# Patient Record
(demographics unavailable — no encounter records)

---

## 2024-11-04 NOTE — REASON FOR VISIT
[Initial Evaluation] : an initial evaluation [Family Member] : family member [FreeTextEntry1] : primary care, chronic pain, functional decline, forgetfulness  [FreeTextEntry3] : teo Leon  [FreeTextEntry2] : who assists with history due to patient with baseline cognitive impairment

## 2024-11-04 NOTE — PHYSICAL EXAM
[Normal Outer Ear/Nose] : the ears and nose were normal in appearance [Normal] : normal bowel sounds, non-tender [No CVA Tenderness] : no CVA  tenderness [No Spinal Tenderness] : no spinal tenderness [No Clubbing, Cyanosis] : no clubbing or cyanosis of the fingernails [Normal Hearing] : hearing was not normal [Normal Gait] : abnormal gait [Normal Insight/Judgment] : insight and judgment were not intact [de-identified] : Kyphoscoliosis  [de-identified] : asymmetric gait, slow, cautious, b/l foot deformity  [de-identified] : Lt plantar foot swelling w/ callus  [de-identified] : unsteady gait, SMT loss  [de-identified] : cooperative

## 2024-11-04 NOTE — SOCIAL HISTORY
[Alone] : lives alone [Female] : Female [FreeTextEntry1] : Carolyn [FreeTextEntry4] : daughter [FreeTextEntry3] : HHA Sati, checks in regularly approx 3-4x/day but patient resists help

## 2024-11-04 NOTE — HISTORY OF PRESENT ILLNESS
[No falls in past year] : Patient reported no falls in the past year [Patient is independent with] : bathing [NO] : No [0] : 1) Little interest or pleasure doing things: Not at all (0) [3] : 2) Feeling down, depressed, or hopeless for nearly every day (3) [PHQ-2 Positive] : PHQ-2 Positive [I have developed a follow-up plan documented below in the note.] : I have developed a follow-up plan documented below in the note. [Yes] : Yes [No] : No [Independent] : transferring/mobility [] : Assistance needed with traveling/transport [Smoke Detector] : smoke detector [Carbon Monoxide Detector] : carbon monoxide detector [Grab Bars] : grab bars [Shower Chair] : shower chair [Wears Seat Belt] : wears seat belt [TextBox_31] : Wears HAs, wears HAs from young age  [TextBox_37] : Ophtho [Driving Concerns] : not driving or driving without noted concerns [facility]] : >12  [FreeTextEntry1] : Doesn't like to have someone assist or supervise shower.   [ARQ6Orymd] : 3 [GDS] : 6 on 11/4/24  [AdvancecareDate] : 11/4/24 [FreeTextEntry4] : HCP form reviewed: primary is dtr Carolyn Newton, then dtr Peg (in [place]).  Most important: maintaining cognitive function.  "I don't want my children to see me being unable to recognize them."  GOC: maintaining cognitive function is priority.

## 2024-11-04 NOTE — ASSESSMENT
[FreeTextEntry1] : Available medical records reviewed  Spent >5 mins on Depression screening.  Suspect likely depression, in setting of chronic pain, cognitive decline Previous trial of Duloxetine - stopped d/t side effects?  Will consider trial another antidepressant at next visit - Gabapentin vs Zoloft.  f/u for further discussion at next visit  Pain control discussed - Adv Tylenol 1g TID for pain - Limit Aleeve to PRN mod-severe pain only for now - Previously had SE to Duloxetine?  - Trial another antidepressant? Gabapentin? Lyrica?   PT referral given - also to george for appropriate/safest WAD Fall precautions c/w regular podiatry f/u  Proper footwear discussed - for safety   Would benefit from regular HHA - discussed  Ophtho referral given   - Plan for formal cognitive/mood/behavior assessment at next visit  To request prior Brain imaging Checks labs at next visit f/u within 1 mo

## 2024-11-04 NOTE — REVIEW OF SYSTEMS
[Loss Of Hearing] : hearing loss [As Noted in HPI] : as noted in HPI [Negative] : Heme/Lymph [Suicidal] : not suicidal [FreeTextEntry3] : wears glasses  [FreeTextEntry4] : +HAs

## 2024-11-04 NOTE — HISTORY OF PRESENT ILLNESS
[No falls in past year] : Patient reported no falls in the past year [Patient is independent with] : bathing [NO] : No [0] : 1) Little interest or pleasure doing things: Not at all (0) [3] : 2) Feeling down, depressed, or hopeless for nearly every day (3) [PHQ-2 Positive] : PHQ-2 Positive [I have developed a follow-up plan documented below in the note.] : I have developed a follow-up plan documented below in the note. [Yes] : Yes [No] : No [Independent] : transferring/mobility [] : Assistance needed with traveling/transport [Smoke Detector] : smoke detector [Carbon Monoxide Detector] : carbon monoxide detector [Grab Bars] : grab bars [Shower Chair] : shower chair [Wears Seat Belt] : wears seat belt [TextBox_31] : Wears HAs, wears HAs from young age  [TextBox_37] : Ophtho [Driving Concerns] : not driving or driving without noted concerns [[facility]] : >12  [FreeTextEntry1] : Doesn't like to have someone assist or supervise shower.   [WTI1Egexg] : 3 [GDS] : 6 on 11/4/24  [AdvancecareDate] : 11/4/24 [FreeTextEntry4] : HCP form reviewed: primary is dtr Carolyn Newton, then dtr Peg (in [place]).  Most important: maintaining cognitive function.  "I don't want my children to see me being unable to recognize them."  GOC: maintaining cognitive function is priority.

## 2024-11-04 NOTE — PHYSICAL EXAM
[Normal Outer Ear/Nose] : the ears and nose were normal in appearance [Normal] : normal bowel sounds, non-tender [No CVA Tenderness] : no CVA  tenderness [No Spinal Tenderness] : no spinal tenderness [No Clubbing, Cyanosis] : no clubbing or cyanosis of the fingernails [Normal Hearing] : hearing was not normal [Normal Gait] : abnormal gait [Normal Insight/Judgment] : insight and judgment were not intact [de-identified] : Kyphoscoliosis  [de-identified] : asymmetric gait, slow, cautious, b/l foot deformity  [de-identified] : Lt plantar foot swelling w/ callus  [de-identified] : unsteady gait, SMT loss  [de-identified] : cooperative

## 2024-11-19 NOTE — END OF VISIT
[Time Spent: ___ minutes] : I have spent [unfilled] minutes of time on the encounter which excludes teaching and separately reported services.
X Size Of Lesion In Cm (Optional): 0
Detail Level: Simple

## 2024-11-19 NOTE — REASON FOR VISIT
[Acute] : an acute visit [FreeTextEntry1] : neck pain, headache, forgetfullness [FreeTextEntry3] : teo Leon [FreeTextEntry2] : who assists with history due to patient with baseline cognitive impairment

## 2024-11-19 NOTE — ASSESSMENT
[FreeTextEntry1] : After discussion we decide to pursue imaging of c-spine, and brain for further evaluation.  Pain control w/ Tylenol 1gram TID. Limit use of NSAIDs.  Hold off on PT for now, pending imaging.   f/u in 1 week.   If any new/worsening symptoms advised to call us asap or go to emergency room.
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
## 2024-11-29 NOTE — HISTORY OF PRESENT ILLNESS
[FreeTextEntry1] : Patient reports neck pain usually on the left side but goes back and forth between right and left, radiating to left shoulder sometimes, associated with headache.    Has not tried lidocaine patch yet.  Daughter states picked up OTC Aspercreme patch and will try that first.  If needs stronger will try prescription lidocaine patch.  Sent to [facility].  States has been taking Advil at bedtime which helps.  Discussed r/b/a of trial Gabapentin - family wishes to try. Will send to pharmacy.  Fall precautions discussed. Adv supervision of med intake for safety.  Monitor for side effects.  Adv inc supervision, michelle during medication changes.   New HHA starting this coming Monday - 8h/d on 5d/wk (10a-6p). Family will stop by on weekends.

## 2024-11-29 NOTE — ASSESSMENT
[FreeTextEntry1] : MRI Cervical spine results reviewed.   Referral to neurology given for neck pain/headache, and functional decline PT referral previously given - pt/dtr will share MRI report with therapist  Pain control discussed  c/w Tylenol 1g TID for pain Plan for aspercreme/lido patch trial Will consider trial Gabapentin in future if needed but hold off for now pending inc supervision/assistance at home.  h/o possible side effect to Duloxetine? Unclear as patient not reliable historian.  Pt wishes to c/w Advil. Adv to take Pepcid while on Advil for GI ppx.   Fall precautions.   All questions/concerns addressed.   f/u in 2-4wks for re-evaluation.   If any new/worsening symptoms advised to call us asap or go to emergency room.

## 2024-11-29 NOTE — REASON FOR VISIT
[Follow-Up] : a follow-up visit [FreeTextEntry1] : Neck pain, headache  [FreeTextEntry3] : Dtr Carolyn, dtr Peg  [FreeTextEntry2] : who assists with history due to patient with baseline cognitive impairment

## 2024-12-26 NOTE — HISTORY OF PRESENT ILLNESS
[Home] : at home, [unfilled] , at the time of the visit. [Medical Office: ([facility])___] : at the medical office located in  [Family Member] : family member [FreeTextEntry3] : teo Leon [FreeTextEntry1] :  Today patient reports "very little pain."  Overall pain has been better for past 1 week.  Doing PT.   Yesterday had back pain. Advil helps.  Tried Gabapentin x 1 dose - stopped d/t "stomach side effects."  Hot compress makes pain worse.   Mood has been down.  Pt states biggest priority is not being a burden on her kids.

## 2024-12-26 NOTE — ASSESSMENT
[FreeTextEntry1] : Long discussion about pain, mood, and cognitive symptoms today.  Patient states that her priority is not being a burden on her family.  Pain controlled for past 1 week per patient. Adv to limit use of Advil to mod-severe pain.  c/w Pepcid while on Advil.  Lidocaine patch.  c/w PT  Decline neuro referral for further eval of pain and cognitive changes - state would not .  Pt declines neuropsych testing.   Dtr states will continue discussion with patient re: re-trial of Gabapentin for mood/pain.  f/u in 2-4wks, unless earlier PRN

## 2024-12-26 NOTE — REASON FOR VISIT
[Follow-Up] : a follow-up visit [Family Member] : family member [FreeTextEntry1] : pain, depressed mood  [FreeTextEntry3] : teo Leon

## 2024-12-26 NOTE — PHYSICAL EXAM
[Normal] : alert, in no acute distress [Sclera] : the sclera and conjunctiva were normal [Normal Outer Ear/Nose] : the ears and nose were normal in appearance [Normal Appearance] : the appearance of the neck was normal [No Respiratory Distress] : no respiratory distress [Respiration, Rhythm And Depth] : normal respiratory rhythm and effort [de-identified] : dysphoric mood

## 2025-01-14 NOTE — HISTORY OF PRESENT ILLNESS
[Home] : at home, [unfilled] , at the time of the visit. [Medical Office: ([facility])___] : at the medical office located in  [Family Member] : family member [FreeTextEntry1] :  Today patient reports back of neck is hurting.  Headaches. Rt side, sometimes Lt side.   Neck and head pain - different types of pain, heat helps, not tingling, not sharp, medium to heavy pain, exacerbated by movement. Some nights neck/head pain wakes her up.  Head "heaviness"  Lower back pain - exacerbated by movement   Doing PT but hasn't been regular - PT was out for holiday and now sick.   Advil helps - takes q4h.  Taking Gabapentin 100mg daily at 10am - not noting any difference.   Mood has been better. Getting out of apartment regularly - going to meals in living facility "almost every day".   Nocturia at night only, q2hrs.  Not during the day. Avoids fluids in evening.    [FreeTextEntry3] : teo Leon

## 2025-01-14 NOTE — PHYSICAL EXAM
[Normal] : alert, in no acute distress [Sclera] : the sclera and conjunctiva were normal [Normal Outer Ear/Nose] : the ears and nose were normal in appearance [Normal Appearance] : the appearance of the neck was normal [No Respiratory Distress] : no respiratory distress [Respiration, Rhythm And Depth] : normal respiratory rhythm and effort [Normal Insight/Judgment] : insight and judgment were not intact [de-identified] : dysphoric mood

## 2025-01-14 NOTE — REASON FOR VISIT
[Follow-Up] : a follow-up visit [Family Member] : family member [FreeTextEntry1] : chronic pain, depressed mood  [FreeTextEntry3] : teo Leon [FreeTextEntry2] : who assists with history due to patient with baseline cognitive impairment

## 2025-01-14 NOTE — ASSESSMENT
[FreeTextEntry1] : Discussed pain, mood, and cognitive symptoms.  Patient states that her priority is not being a burden on her family.   Takes Ibuprofen q4h approx.  Takes Gabapentin 100mg @ 10am - dtr states mood is better but no noted improvement in pain. No side effects.   Discussed trial inc Gabapentin to 200mg daily, if tolerating can further go up to 300mg daily after 1 week, then 400mg daily after another week, max 600mg/day. Monitor for side effects - discussed. For mood/pain.  Adv to limit use of Advil to mod-severe pain.  c/w Pepcid while on Advil.  Lidocaine patch - didn't help.   Pain management referral given.  Consider neuro/headache eval.   Discussed nonpharmacologic management of nocturia, including timed voiding, avoiding fluids before bed, avoiding seltzer water/sodas, avoid caffeine, sleep hygiene.  Urogyn ref given. Pelvic floor therapy?   Declines bedside commode.  Fall precautions discussed.  c/w PT  Pt declines neuropsych testing.   f/u in 1 mo, unless earlier PRN

## 2025-02-04 NOTE — PHYSICAL EXAM
[Normal Insight/Judgment] : insight and judgment were not intact [de-identified] : dysphoric mood

## 2025-02-04 NOTE — REASON FOR VISIT
[FreeTextEntry1] : chronic pain, depressed mood  [FreeTextEntry3] : teo Leon [FreeTextEntry2] : who assists with history due to patient with baseline cognitive impairment

## 2025-02-04 NOTE — HISTORY OF PRESENT ILLNESS
[FreeTextEntry1] : Today patient reports left sided neck pain radiating to Lt shoulder.   Seen by ortho and planned for c-spine injection later today w/ pain management Dr. Donald.  Was advised to switch to TYlenol Arthritis but unclear if taking regularly.  Heat helps but was told to switch to cold compress instead.  Advil helps - takes q4h.  Lidocaine patch - didn't help.   Has not been leaving apartment much. States doesn't feel well enough sometimes to go for walk with HHA because feels tired and needs to take a nap in afternoon.     Taking Gabapentin 100mg BID but unclear if making a difference.   Nocturia at night only, q2hrs.  Not during the day. Avoids fluids in evening.  Sleeps from 11p - 9a.   Neck and head pain - different types of pain, heat helps, not tingling, not sharp, medium to heavy pain, exacerbated by movement. Some nights neck/head pain wakes her up.  Head "heaviness"  Lower back pain - exacerbated by movement   Doing PT but being discharged soon. Planned to start OT next.   Activating LTC insurance - requests supporting documentation.       [FreeTextEntry3] : teo Leon

## 2025-02-04 NOTE — ASSESSMENT
[FreeTextEntry1] : Takes Ibuprofen q4h approx.  Takes Gabapentin 100mg BID - c/w same, refilled. Hold off on further titration for now, pending c-spine inj today w/ pain management. Monitor for side effects - discussed. For mood/pain.  c/w cold compresses on/off.  Adv to limit use of Advil to mod-severe pain.  c/w Pepcid while on Advil.  Tylenol up to 3g/day.  Lidocaine patch - didn't help.  Consider neuro/headache eval.   Discussed nonpharmacologic management of nocturia, including timed voiding, avoiding fluids before bed, avoiding seltzer water/sodas, avoid caffeine, sleep hygiene.  Urogyn ref previously given.Pelvic floor therapy?  Will consider trial Myrbetriq but hold off for now as likely risks>benefit.  Declines bedside commode.   Fall precautions.  Completing PT. Pending start OT.   Adv to encourage daytime activity/socialization.   Pt declines neuropsych testing.  GOC: Patient states that her priority is not being a burden on her family.   Dtr will let me know what is needed for LTC insurance application.   f/u in 1 mo, unless earlier PRN

## 2025-03-15 NOTE — ASSESSMENT
[FreeTextEntry1] : Takes Ibuprofen approx q4h - would limit use to PRN mod-severe pain d/t high risk for SEs.  c/w Pepcid while on Advil.  c/w Tylenol up to 3g/day.  c/w cold compresses on/off.  c/w Lidocaine patch  Discussed trial increase Gabapentin 100mg TID to 200mg in AM /200mg in afternoon /100mg QHS for mood/pain. . Can inc QHS dose to 200mg PRN mod-severe pain/insomnia d/t pain.   Appt w/ neuro/headache Dr. Almanzar 4/2025.    Discussed nonpharmacologic management of nocturia, including timed voiding, avoiding fluids before bed, avoiding seltzer water/sodas, avoid caffeine, sleep hygiene.  Would consider trial Myrbetriq but hold off for now as likely risks>benefit.  Urogyn ref again given. Pelvic floor therapy? Purewick?  Declines bedside commode.   Fall precautions.   Adv to encourage daytime activity/socialization.   Pt declines neuropsych testing.  GOC: Patient states that her priority is not being a burden on her family.   f/u in 1 mo, unless earlier PRN

## 2025-03-15 NOTE — HISTORY OF PRESENT ILLNESS
[Home] : at home, [unfilled] , at the time of the visit. [Medical Office: ([facility])___] : at the medical office located in  [Family Member] : family member [FreeTextEntry1] : Today patient reports being more active since last visit - going down for meals regularly at the [facility] which wasn't doing before.   Mood is better.   Left sided neck/head pain radiating to Lt shoulder with "some relief."  Had nerve block w/ pain mgmt Dr. Donald since last visit.  Neck and head pain - different types of pain, no tingling, not sharp, exacerbated by movement, a/w head "heaviness."  Lower back pain - exacerbated by movement.  Takes Tylenol 8hr Arthritis approx. BID-TID.  Cold compress helps.  Advil helps - takes q4h.  Lidocaine patch.   Taking Gabapentin 100mg TID and tolerating well.   Main concern is urinary frequency at night, q1-2hrs. Distrupts sleep.   Nocturia at night only, q2hrs.  Not during the day. Avoids fluids in evening.  Sleeps from 11p - 9a.  Has been limiting fluids d/t urinary freq.    [FreeTextEntry3] : teo Leon

## 2025-03-15 NOTE — PHYSICAL EXAM
[Sclera] : the sclera and conjunctiva were normal [Normal] : alert, in no acute distress [Normal Outer Ear/Nose] : the ears and nose were normal in appearance [Normal Appearance] : the appearance of the neck was normal [No Respiratory Distress] : no respiratory distress [No Acc Muscle Use] : no accessory muscle use [Respiration, Rhythm And Depth] : normal respiratory rhythm and effort [Normal Gait] : abnormal gait [Normal Insight/Judgment] : insight and judgment were not intact [de-identified] : dysphoric mood

## 2025-04-16 NOTE — REVIEW OF SYSTEMS
[Incontinence] : incontinence [Wake up at night to urinate  How many times?  ___] : wakes up to urinate [unfilled] times during the night [Leakage of urine with urgency] : leakage of urine with urgency [Leakage of urine with straining, coughing, laughing] : leakage of urine with straining, coughing, laughing

## 2025-04-22 NOTE — ADDENDUM
[FreeTextEntry1] : This note was partly authored by Rj Yi working as a scribe for JOSE Dumont. I, JOSE Dumont, have reviewed the content of this note and confirm it is true and accurate. I personally performed the history and physical examination and made all the decisions. 04/16/2025.

## 2025-04-22 NOTE — ASSESSMENT
[FreeTextEntry1] : Due to issues with back pain, patient fins it difficult to sit on the toilet seat.  Unable to provide urine specimen today  Residual today: 47 cc  Explained to patient that Nocturia is not structural. Counseled about factors that could cause or worsen nocturia including underlying sleep apnea, fluid in her legs, and sleep disorders. OAB meds don't typically help nocturia to a significant degree and given the side effects I do not recommend trying them for nocturia.  I recommend leg elevation and see if that improves nocturia. She is also urged to wear compression socks.    The total amount of time I have personally spent preparing for this visit, reviewing the patient's test results, obtaining external history, ordering tests/medications, documenting clinical information, communicating with and counseling the patient/family and/or caregiver(s), reviewing old records, and spent face to face with the patient explaining the above was __46 minutes.

## 2025-04-22 NOTE — PHYSICAL EXAM
[Normal Appearance] : normal appearance [General Appearance - In No Acute Distress] : no acute distress [] : no respiratory distress [Skin Color & Pigmentation] : normal skin color and pigmentation [de-identified] : Residual today: 47 cc

## 2025-04-22 NOTE — PHYSICAL EXAM
[Normal Appearance] : normal appearance [General Appearance - In No Acute Distress] : no acute distress [] : no respiratory distress [Skin Color & Pigmentation] : normal skin color and pigmentation [de-identified] : Residual today: 47 cc

## 2025-04-22 NOTE — HISTORY OF PRESENT ILLNESS
[FreeTextEntry1] : 04/16/2025: Ms. VYAS is a 93-year-old female with h/o HTN, Early Dementia presenting today with nocturia  Pt c/o nocturia that she finds bothersome  She also Nighttime UUI and daytime AILEEN for which she uses safety pads.  No daytime urgency frequency or incontinence.   She goes to bed at around 11 am; and wakes up at around 3am to 4 am.  Her feet are usually elevated before bedtime.   She drinks 1 cup of coffee in the morning Does not drink Alcohol.   She voids x4 during the daytime -Residual today 47 cc (not retaining)  -UA on 3/18/25 showed 6 wbc;  6 epithelial cells; and LE. Otherwise, normal -Cx was negative.  -Cr 1.19 w eGFR 43 on 11/20/24 (diminished renal fxn but we only have one value)  No available abdominal imaging.   Bowels are good and regular

## 2025-04-24 NOTE — HISTORY OF PRESENT ILLNESS
[FreeTextEntry1] : Uro Dr. Guzman - visit note appreciated in EMR  PM&R Dr. Donald - seen for f/u since last visit, including for occipital n. block 3 weeks ago, and f/u visit this past Mon 4/21/25. Daughter states pain initially improved but now back to where it was prior to nerve block.  TODAY patient reports some improvement in urinary freq. States this limits her ability to leave her apartment, in addition to uncontrolled neck pain.  Neck and head pain - different types of pain, no tingling, not sharp, exacerbated by movement, a/w head "heaviness." Left sided neck/head pain radiating to Lt shoulder.  Lower back pain - exacerbated by movement. Takes Tylenol 8hr Arthritis approx. BID-TID.  Cold compress helps.  Advil helps - takes q4h.  Lidocaine patch.   Taking Gabapentin 200mg TID and tolerating well.   Cancelled appt w/ neuro/headache Dr. Almanzar in 4/2025.    Adds that her memory is declining and she would like to try Aricept again to help slow down progression.   Mood is "same."  Slept for 6hrs continuously last night. States once she wakes up difficult to go back to sleep. UF distrupts sleep.   Nocturia at night only, not during the day. Avoids fluids in evening.  Sleeps from 11p - 9a.    [Home] : at home, [unfilled] , at the time of the visit. [Medical Office: ([facility])___] : at the medical office located in  [Family Member] : family member [FreeTextEntry3] : teo Leon

## 2025-04-24 NOTE — REASON FOR VISIT
[Follow-Up] : a follow-up visit [Family Member] : family member [FreeTextEntry1] : urinary freq, chronic pain, depressed mood , anxiety, memory loss [FreeTextEntry3] : teo Leon [FreeTextEntry2] : who assists with history due to patient with baseline cognitive impairment

## 2025-04-24 NOTE — PHYSICAL EXAM
[Sclera] : the sclera and conjunctiva were normal [Normal Outer Ear/Nose] : the ears and nose were normal in appearance [Normal Appearance] : the appearance of the neck was normal [No Respiratory Distress] : no respiratory distress [No Acc Muscle Use] : no accessory muscle use [Respiration, Rhythm And Depth] : normal respiratory rhythm and effort [Normal Gait] : abnormal gait [Normal] : normal skin color and pigmentation [Normal Insight/Judgment] : insight and judgment were not intact [de-identified] : limited exam  [de-identified] : dysphoric mood

## 2025-04-24 NOTE — ASSESSMENT
[FreeTextEntry1] :  Takes Ibuprofen approx q4h - would limit use to PRN mod-severe pain d/t high risk for SEs.  c/w Pepcid while on Advil.  c/w Tylenol up to 3g/day.  c/w cold compresses on/off.  c/w Lidocaine patch c/w Gabapentin 200mg in AM / INCREASE Gabapentin to 300mg in afternoon / c/w 200mg QHS for mood/pain/insomnia. In future will consider trial switch Gabapentin to Lyrica if needed.  MM?   Discussed nonpharmacologic management of nocturia, including timed voiding, avoiding fluids before bed, avoiding seltzer water/sodas, avoid caffeine, sleep hygiene.  Can consider trial Myrbetriq but hold off for now as likely risks>benefit.  Declines bedside commode.  Urogyjose vazquez appreciated in EMR.    Fall precautions.  Adv to encourage daytime activity/socialization.   Declines neuropsych testing.  GOC: Patient states that her priority is not being a burden on her family.  Discussed r/b/a of Donepezil - wishes to re-trial. Sent to pharmacy. Monitor for SEs.   f/u in 1 mo, unless earlier PRN

## 2025-05-19 NOTE — HISTORY OF PRESENT ILLNESS
[Home] : at home, [unfilled] , at the time of the visit. [Medical Office: ([facility])___] : at the medical office located in  [Family Member] : family member [FreeTextEntry1] : Uro Dr. Guzman - visit note appreciated in EMR  PM&R Dr. Donald - seen for f/u since last visit - planned for trial epidural at next f/u visit next Tuesday.  TODAY patient reports 7/8 intensity posterior neck pain on/off that changes in character and intensity, Lt occipital head pain, and tolerable chronic LBP which has been there since teenager and unchanged.  Neck/head pain is difficult for pt to describe, not achy/sharp/or throbbing. Worse with change in position, head rotation to either side, better when looking forwards/straight. Limits her ability to go out for meals because afraid will need to turn her head to speak to others at [facility] table and pain will be worse.   Dtr states Dr. Woodard appreciated some decrease in neck on exam at last visit, although patient did not notice a difference after nerve block given.   Takes Tylenol 8hr Arthritis approx. BID-TID.  Cold compress helps.  Advil helps - takes BID.  Lidocaine patch does not help - stopped.   On Gabapentin 200mg in AM / 300mg in afternoon / 200mg QHS for mood/pain/insomnia.  Mood is "same."  TOILETING: Sleeps from 11p - 9a. Nocturia at night only, not during the day. UF distrupts sleep.   Avoids fluids in evening.    [FreeTextEntry3] : teo Leon

## 2025-05-19 NOTE — REASON FOR VISIT
[Follow-Up] : a follow-up visit [Family Member] : family member [FreeTextEntry1] : chronic pain, depressed mood, anxiety, memory loss [FreeTextEntry3] : teo Leon [FreeTextEntry2] : who assists with history due to patient with baseline cognitive impairment

## 2025-05-19 NOTE — PHYSICAL EXAM
[Sclera] : the sclera and conjunctiva were normal [Normal Outer Ear/Nose] : the ears and nose were normal in appearance [Normal Appearance] : the appearance of the neck was normal [No Respiratory Distress] : no respiratory distress [No Acc Muscle Use] : no accessory muscle use [Respiration, Rhythm And Depth] : normal respiratory rhythm and effort [Normal] : normal skin color and pigmentation [Involuntary Movements] : no involuntary movements were seen [Normal Insight/Judgment] : insight and judgment were not intact [de-identified] : limited exam  [de-identified] : dysphoric mood, repetitive

## 2025-05-19 NOTE — ASSESSMENT
[FreeTextEntry1] : Takes Ibuprofen approx BID - would limit use to PRN mod-severe pain d/t high risk for SEs.  c/w Pepcid while on Advil.  c/w Tylenol up to 3g/day.  c/w cold compresses on/off.  Stopped Lidocaine patch - "doesn't help"  c/w Gabapentin 200mg in AM / 300mg in afternoon / 200mg QHS for mood/pain/insomnia. Discussed possible trial switch Gabapentin to Lyrica but we decide to hold off pending scheduled epidural next Tues. If ineffective will trial switch Gabapentin to Lyrica.  MM?   c/w nonpharmacologic management of nocturia, including timed voiding, avoiding fluids before bed, avoiding seltzer water/sodas, avoid caffeine, sleep hygiene.  Can consider trial Myrbetriq but hold off for now as likely risks>benefit.  Declines bedside commode.   Fall precautions.  Adv to encourage daytime activity/socialization.   Declines neuropsych testing.  GOC: Patient states that her priority is not being a burden on her family.  c/w Donepezil. Monitor for SEs.   f/u in 2-4 wks, unless earlier PRN

## 2025-07-06 NOTE — HISTORY OF PRESENT ILLNESS
[FreeTextEntry1] : FILI VYAS is a 94 yo woman with PMH of depression, cognitive impairment and chronic pain who presents for acute visit. Patient is accompanied by her daughter who provided collateral history.   Depression: -patient states "my body is no longer doing what I want it to do and that is very psychologically upsetting" -patient feels that chronic pain is the reason for her depression -daughter feels that depression is exacerbating chronic pain -patient states that on days when she feels ok, she still does not want to go out and do things because she is scared the pain will get worse while she is out  -spends all of her time at home  -patient also has underlying cognitive impairment that is a likely contributor to her symptoms  Chronic pain: -mainly in her neck  -pain is nerve related  -follows with pain management who has done many interventions including an injection on Wednesday that has provided some relief. Rates pain as 3/10 today. Prior to injection, pain was 8/10 -Patient is currently on lyrica 25mg qam, 25mg qpm and gabapentin 200mg qhs

## 2025-07-06 NOTE — PHYSICAL EXAM
[Alert] : alert [No Acute Distress] : in no acute distress [No Respiratory Distress] : no respiratory distress [Normal Gait] : abnormal gait [Normal Affect] : the affect was normal

## 2025-07-06 NOTE — REASON FOR VISIT
[Home] : at home, [unfilled] , at the time of the visit. [Medical Office: ([facility])___] : at the medical office located in  [Telehealth (audio & video)] : This visit was provided via telehealth using real-time 2-way audio visual technology. [Acute] : an acute visit [Verbal consent obtained from patient] : the patient, [unfilled] [Family Member] : family member

## 2025-07-06 NOTE — REASON FOR VISIT
[Home] : at home, [unfilled] , at the time of the visit. [Medical Office: ([facility])___] : at the medical office located in  [Telehealth (audio & video)] : This visit was provided via telehealth using real-time 2-way audio visual technology. [Acute] : an acute visit [Verbal consent obtained from patient] : the patient, [unfilled] [Family Member] : family member

## 2025-07-06 NOTE — REVIEW OF SYSTEMS
[Feeling Poorly] : feeling poorly [Feeling Tired] : feeling tired [Discharge From Eyes] : no purulent discharge from the eyes [Dry Eyes] : no dryness of the eyes [Nasal Discharge] : no nasal discharge [Sore Throat] : no sore throat [Chest Pain] : no chest pain [Shortness Of Breath] : no shortness of breath [Wheezing] : no wheezing [Cough] : no cough [SOB on Exertion] : no shortness of breath during exertion [Abdominal Pain] : no abdominal pain [Vomiting] : no vomiting [Dysuria] : no dysuria [Limb Pain] : limb pain [Skin Wound] : no skin wound [Confused] : confusion [Dizziness] : no dizziness [As Noted in HPI] : as noted in HPI [Suicidal] : not suicidal [Anxiety] : anxiety [Depression] : depression

## 2025-07-15 NOTE — PHYSICAL EXAM
[Sclera] : the sclera and conjunctiva were normal [Normal Outer Ear/Nose] : the ears and nose were normal in appearance [Normal Appearance] : the appearance of the neck was normal [No Respiratory Distress] : no respiratory distress [No Acc Muscle Use] : no accessory muscle use [Respiration, Rhythm And Depth] : normal respiratory rhythm and effort [Involuntary Movements] : no involuntary movements were seen [Normal] : normal skin color and pigmentation [Normal Gait] : abnormal gait [Normal Insight/Judgment] : insight and judgment were not intact [de-identified] : limited ROM neck  [de-identified] : limited exam  [de-identified] : dysphoric mood, repetitive

## 2025-07-15 NOTE — PHYSICAL EXAM
[Sclera] : the sclera and conjunctiva were normal [Normal Outer Ear/Nose] : the ears and nose were normal in appearance [Normal Appearance] : the appearance of the neck was normal [No Respiratory Distress] : no respiratory distress [No Acc Muscle Use] : no accessory muscle use [Respiration, Rhythm And Depth] : normal respiratory rhythm and effort [Involuntary Movements] : no involuntary movements were seen [Normal] : normal skin color and pigmentation [Normal Gait] : abnormal gait [Normal Insight/Judgment] : insight and judgment were not intact [de-identified] : limited ROM neck  [de-identified] : limited exam  [de-identified] : dysphoric mood, repetitive

## 2025-07-15 NOTE — ASSESSMENT
[FreeTextEntry1] : Takes Ibuprofen approx BID - would limit use to PRN mod-severe pain d/t high risk for SEs.  c/w Pepcid while on Advil.  c/w Tylenol up to 3g/day.  c/w cold compresses on/off.  Stopped Lidocaine patch - "doesn't help"  c/w Lyrica 25mg TID MM?   Lab work ordered - f/u results Will consider add trial Cymbalta for mood/pain.  Carbamazepine trial for Trigeminal Neuralgia vs rhizotomy? f/u with PMR  c/w nonpharmacologic management of nocturia, including timed voiding, avoiding fluids before bed, avoiding seltzer water/sodas, avoid caffeine, sleep hygiene.  Can consider trial Myrbetriq but hold off for now as likely risks>benefit.  Declines bedside commode.   Fall precautions.  Adv to encourage daytime activity/socialization.   Declines neuropsych testing.  GOC: Patient states that her priority is not being a burden on her family.  c/w Donepezil. Monitor for SEs.   f/u in 2-4 wks, unless earlier PRN

## 2025-07-15 NOTE — HISTORY OF PRESENT ILLNESS
[Home] : at home, [unfilled] , at the time of the visit. [Medical Office: ([facility])___] : at the medical office located in  [Family Member] : family member [FreeTextEntry1] : Uro Dr. Guzman PM&R Dr. Donald - seen for f/u since last visit - tried epidural, minimal relief. Considering rhizotomy for chronic neck/head pain, also mentioned possible trigeminal neuralgia although was told he does not treat this.   TODAY patient reports persistent neck pain and depressed mood.   7/8 intensity posterior neck pain on/off that changes in character and intensity, a/w Lt lower posterior head pain.  Neck/head pain is difficult for pt to describe, not achy/sharp/or throbbing. Worse with change in position, head rotation to either side exacerbates pain, feels better when looking forwards/straight. Limits her ability to go out for meals because afraid will need to turn her head to speak to others at [facility] table and pain will be worse.   Takes Tylenol 8hr Arthritis approx. BID-TID.  Cold compress helps.  Advil helps - takes BID.  Lidocaine patch does not help - stopped.   On Lyrica 25mg TID with minimal improvement so far.  Previously tried Gabapentin with inadequate response.   Mood is depressed.   TOILETING: Sleeps from 11p - 9a. Nocturia at night only, not during the day. UF distrupts sleep.   Avoids fluids in evening.    [FreeTextEntry3] : teo Leon

## 2025-07-15 NOTE — HISTORY OF PRESENT ILLNESS
[Home] : at home, [unfilled] , at the time of the visit. [Medical Office: ([facility])___] : at the medical office located in  [Family Member] : family member [FreeTextEntry1] : Uro Dr. Guzman PM&R Dr. Donald - seen for f/u since last visit - tried epidural, minimal relief. Considering rhizotomy for chronic neck/head pain, also mentioned possible trigeminal neuralgia although was told he does not treat this.   TODAY patient reports persistent neck pain and depressed mood.   7/8 intensity posterior neck pain on/off that changes in character and intensity, a/w Lt lower posterior head pain.  Neck/head pain is difficult for pt to describe, not achy/sharp/or throbbing. Worse with change in position, head rotation to either side exacerbates pain, feels better when looking forwards/straight. Limits her ability to go out for meals because afraid will need to turn her head to speak to others at [facility] table and pain will be worse.   Takes Tylenol 8hr Arthritis approx. BID-TID.  Cold compress helps.  Advil helps - takes BID.  Lidocaine patch does not help - stopped.   On Lyrica 25mg TID with minimal improvement so far.  Previously tried Gabapentin with inadequate response.   Mood is depressed.   TOILETING: Sleeps from 11p - 9a. Nocturia at night only, not during the day. UF distrupts sleep.   Avoids fluids in evening.    [FreeTextEntry3] : teo Leon